# Patient Record
Sex: MALE | Race: WHITE
[De-identification: names, ages, dates, MRNs, and addresses within clinical notes are randomized per-mention and may not be internally consistent; named-entity substitution may affect disease eponyms.]

---

## 2018-07-17 ENCOUNTER — HOSPITAL ENCOUNTER (OUTPATIENT)
Dept: HOSPITAL 47 - RADXRMAIN | Age: 38
Discharge: HOME | End: 2018-07-17
Payer: MEDICARE

## 2018-07-17 DIAGNOSIS — R20.0: ICD-10-CM

## 2018-07-17 DIAGNOSIS — M25.551: Primary | ICD-10-CM

## 2018-07-17 PROCEDURE — 73502 X-RAY EXAM HIP UNI 2-3 VIEWS: CPT

## 2018-07-17 PROCEDURE — 72110 X-RAY EXAM L-2 SPINE 4/>VWS: CPT

## 2018-07-17 NOTE — XR
EXAMINATION TYPE: XR Hip Complete RT

 

DATE OF EXAM: 7/17/2018

 

CLINICAL HISTORY: Right hip pain.

 

TECHNIQUE:  AP and frogleg views of the right hip are obtained.

 

COMPARISON: Right femur x-ray May 21, 2012

 

FINDINGS:  There is no acute fracture/dislocation evident in the right hip.  The joint space in the r
ight hip appears within normal limits.  The overlying soft tissue appears unremarkable.

 

IMPRESSION: Unremarkable study.

## 2018-07-17 NOTE — XR
Lumbosacral spine

 

HISTORY: Leg numbness, low back pain

 

5 views of the lumbosacral spine

 

Correlation to prior exam 3/21/2016

 

There is no significant interval change. Mild lumbar spondylosis. Loss of disc height again noted L5-
S1. There is some straightening of the lumbar curvature. There may be a slight dextroscoliosis center
ed at L3.

 

IMPRESSION: Stable exam. There are findings compatible with degenerative disc disease.

## 2018-07-18 ENCOUNTER — HOSPITAL ENCOUNTER (EMERGENCY)
Dept: HOSPITAL 47 - EC | Age: 38
Discharge: HOME | End: 2018-07-18
Payer: MEDICARE

## 2018-07-18 VITALS — SYSTOLIC BLOOD PRESSURE: 140 MMHG | TEMPERATURE: 98 F | DIASTOLIC BLOOD PRESSURE: 71 MMHG | HEART RATE: 77 BPM

## 2018-07-18 VITALS — RESPIRATION RATE: 18 BRPM
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
## 2018-07-18 DIAGNOSIS — Z79.899: ICD-10-CM

## 2018-07-18 DIAGNOSIS — F17.200: ICD-10-CM

## 2018-07-18 DIAGNOSIS — M54.16: Primary | ICD-10-CM
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
## 2018-07-18 DIAGNOSIS — I10: ICD-10-CM

## 2018-07-18 DIAGNOSIS — Z98.890: ICD-10-CM
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
## 2018-07-18 PROCEDURE — 99283 EMERGENCY DEPT VISIT LOW MDM: CPT

## 2018-07-18 PROCEDURE — 96372 THER/PROPH/DIAG INJ SC/IM: CPT

## 2018-07-18 NOTE — ED
Back Pain HPI





- General


Chief Complaint: Back Pain/Injury


Stated Complaint: Back Pain


Time Seen by Provider: 07/18/18 22:03


Source: patient


Limitations: no limitations





- History of Present Illness


Initial Comments: 





This patient is 38-year-old man who presents with right low back pain and 

radiation of the pain to his right leg.  He does describe a radicular 

distribution down the posterior leg to the foot.  The patient states it started 

2 nights ago when he was on his porch having cigarette he states that he 

reached for something and then had some back pain.  The patient attempted to be 

seen here yesterday but states the wait was too long, so he did go and see a 

clinic doctor who took x-rays and told him that they looked fine.  Patient did 

not have trauma to the back.  He has not had any change in bowel or bladder 

function.  No saddle anesthesia.  No leg weakness.


MD Complaint: back pain


-: days(s)


Similar Symptoms Previously: No


Place: home


Radiation: right leg


Severity: moderate


Quality: burning, aching


Consistency: constant


Improves With: none


Worsens With: sitting upright


Context: turning/twisting


Associated Symptoms: denies other symptoms





- Related Data


 Home Medications











 Medication  Instructions  Recorded  Confirmed


 


Benazepril [Lotensin] 10 mg PO DAILY 07/18/18 07/18/18


 


HYDROcodone/APAP 10-325MG [Norco 1 tab PO Q6HR PRN 07/18/18 07/18/18





]   


 


Propranolol [Inderal] 40 mg PO DAILY 07/18/18 07/18/18


 


amLODIPine [Norvasc] 5 mg PO DAILY 07/18/18 07/18/18








 Previous Rx's











 Medication  Instructions  Recorded


 


Methocarbamol [Robaxin-750] 750 mg PO TID PRN #30 tablet 07/18/18


 


predniSONE 60 mg PO DAILY #30 tab 07/18/18











 Allergies











Allergy/AdvReac Type Severity Reaction Status Date / Time


 


No Known Allergies Allergy   Verified 07/18/18 22:23














Review of Systems


ROS Statement: 


Those systems with pertinent positive or pertinent negative responses have been 

documented in the HPI.





ROS Other: All systems not noted in ROS Statement are negative.


Constitutional: Denies: fever, chills


Respiratory: Denies: cough, dyspnea


Cardiovascular: Denies: chest pain, palpitations, edema


Gastrointestinal: Denies: abdominal pain, vomiting, diarrhea


Genitourinary: Denies: dysuria, hematuria


Musculoskeletal: Reports: as per HPI, back pain


Skin: Denies: rash


Neurological: Reports: as per HPI, paresthesias.  Denies: weakness, numbness





Past Medical History


Past Medical History: Hypertension, Seizure Disorder


Additional Past Medical History / Comment(s): PER PAST MEDCIAL HX: THORACIC/

LUMBAR FACET ARTHROPATHY(HAS RADIOFREQUENCY ABLATION), BACK PAIN, SEIZURE 1997


History of Any Multi-Drug Resistant Organisms: None Reported


Past Surgical History: Cholecystectomy, Hernia Repair, Orthopedic Surgery


Additional Past Surgical History / Comment(s): HERNIA REPAIR,HAD RECURRANT 

INCISONAL HERNIA X2 SX, RT ANKLE ORIF, PAIN CLINIC PROCEDURES, RADIOFREQUENCY 

ABLATION RT T12, L1-L2


Past Anesthesia/Blood Transfusion Reactions: No Reported Reaction


Past Psychological History: ADD/ADHD, Anxiety, Depression


Smoking Status: Current every day smoker


Past Alcohol Use History: Occasional


Past Drug Use History: Marijuana





- Past Family History


  ** Father


Family Medical History: Unable to Obtain





  ** Mother


Family Medical History: Unable to Obtain





General Exam


Limitations: no limitations


General appearance: alert, in no apparent distress


Head exam: Present: atraumatic, normocephalic


Eye exam: Present: normal appearance.  Absent: scleral icterus, conjunctival 

injection


Neck exam: Present: normal inspection


Respiratory exam: Present: normal lung sounds bilaterally.  Absent: respiratory 

distress, wheezes, rales, rhonchi, stridor


Cardiovascular Exam: Present: regular rate, normal rhythm, normal heart sounds.

  Absent: systolic murmur, diastolic murmur, rubs, gallop


GI/Abdominal exam: Present: soft.  Absent: distended, tenderness, guarding, 

rebound, rigid, mass, pulsatile mass


Extremities exam: Present: normal inspection, normal capillary refill.  Absent: 

pedal edema, calf tenderness


Back exam: Present: normal inspection, CVA tenderness (R), CVA tenderness (L).  

Absent: tenderness, paraspinal tenderness, vertebral tenderness


Neurological exam: Present: reflexes normal.  Absent: motor sensory deficit


Skin exam: Present: warm, dry, intact, normal color.  Absent: rash





Course


 Vital Signs











  07/18/18





  21:42


 


Temperature 98.1 F


 


Pulse Rate 63


 


Respiratory 18





Rate 


 


Blood Pressure 169/80


 


O2 Sat by Pulse 97





Oximetry 














Disposition


Clinical Impression: 


 Lumbar radiculopathy





Disposition: HOME SELF-CARE


Condition: Good


Instructions:  Acute Low Back Pain (ED)


Prescriptions: 


Methocarbamol [Robaxin-750] 750 mg PO TID PRN #30 tablet


 PRN Reason: pain


predniSONE 60 mg PO DAILY #30 tab


Is patient prescribed a controlled substance at d/c from ED?: No


Referrals: 


Lorraine Escobar MD [Primary Care Provider] - 1-2 days

## 2018-08-03 ENCOUNTER — HOSPITAL ENCOUNTER (OUTPATIENT)
Dept: HOSPITAL 47 - RADUSWWP | Age: 38
Discharge: HOME | End: 2018-08-03
Payer: MEDICARE

## 2018-08-03 DIAGNOSIS — R94.5: Primary | ICD-10-CM

## 2018-08-03 PROCEDURE — 76705 ECHO EXAM OF ABDOMEN: CPT

## 2018-08-03 NOTE — US
EXAMINATION TYPE: US liver

 

DATE OF EXAM: 8/3/2018

 

COMPARISON: NONE

 

CLINICAL HISTORY: R94.5 ABN LIVER FUNCTIONS.

 

EXAM MEASUREMENTS:

 

Liver Length:  15.0 cm   

Gallbladder Wall:  Surgically absent   

CBD:  0.3 cm

Right Kidney:  13.3 x 6.6 x 5.6 cm

 

 

 

Pancreas:  partially obscured by bowel gas, portions visualized wnl

Liver:  wnl  

Gallbladder:  wnl

**Evidence for sonographic George's sign: no

CBD:  wnl 

Right Kidney:  Upper limits of normal 

 

 

 

IMPRESSION: 

1. Normal right upper quadrant ultrasound

## 2018-11-10 ENCOUNTER — HOSPITAL ENCOUNTER (OUTPATIENT)
Dept: HOSPITAL 47 - EC | Age: 38
Setting detail: OBSERVATION
Discharge: ANOTHER HOSPITAL | End: 2018-11-10
Attending: HOSPITALIST | Admitting: HOSPITALIST
Payer: MEDICARE

## 2018-11-10 VITALS — SYSTOLIC BLOOD PRESSURE: 158 MMHG | DIASTOLIC BLOOD PRESSURE: 96 MMHG | TEMPERATURE: 97.9 F

## 2018-11-10 VITALS — RESPIRATION RATE: 16 BRPM

## 2018-11-10 VITALS — HEART RATE: 66 BPM

## 2018-11-10 VITALS — BODY MASS INDEX: 36.6 KG/M2

## 2018-11-10 DIAGNOSIS — G37.8: Primary | ICD-10-CM

## 2018-11-10 DIAGNOSIS — R26.2: ICD-10-CM

## 2018-11-10 DIAGNOSIS — G89.29: ICD-10-CM

## 2018-11-10 DIAGNOSIS — I10: ICD-10-CM

## 2018-11-10 DIAGNOSIS — M51.26: ICD-10-CM

## 2018-11-10 DIAGNOSIS — R19.7: ICD-10-CM

## 2018-11-10 DIAGNOSIS — F41.9: ICD-10-CM

## 2018-11-10 DIAGNOSIS — F90.9: ICD-10-CM

## 2018-11-10 DIAGNOSIS — Z90.49: ICD-10-CM

## 2018-11-10 DIAGNOSIS — G40.909: ICD-10-CM

## 2018-11-10 DIAGNOSIS — M46.95: ICD-10-CM

## 2018-11-10 DIAGNOSIS — F32.9: ICD-10-CM

## 2018-11-10 DIAGNOSIS — M54.9: ICD-10-CM

## 2018-11-10 DIAGNOSIS — Z79.899: ICD-10-CM

## 2018-11-10 DIAGNOSIS — F17.200: ICD-10-CM

## 2018-11-10 LAB
ANION GAP SERPL CALC-SCNC: 8 MMOL/L
BASOPHILS # BLD AUTO: 0.1 K/UL (ref 0–0.2)
BASOPHILS NFR BLD AUTO: 1 %
BUN SERPL-SCNC: 19 MG/DL (ref 9–20)
CALCIUM SPEC-MCNC: 9.2 MG/DL (ref 8.4–10.2)
CHLORIDE SERPL-SCNC: 107 MMOL/L (ref 98–107)
CO2 SERPL-SCNC: 25 MMOL/L (ref 22–30)
EOSINOPHIL # BLD AUTO: 0.2 K/UL (ref 0–0.7)
EOSINOPHIL NFR BLD AUTO: 1 %
ERYTHROCYTE [DISTWIDTH] IN BLOOD BY AUTOMATED COUNT: 5.71 M/UL (ref 4.3–5.9)
ERYTHROCYTE [DISTWIDTH] IN BLOOD: 13.7 % (ref 11.5–15.5)
GLUCOSE SERPL-MCNC: 100 MG/DL (ref 74–99)
HCT VFR BLD AUTO: 52.5 % (ref 39–53)
HGB BLD-MCNC: 17.3 GM/DL (ref 13–17.5)
LYMPHOCYTES # SPEC AUTO: 4.8 K/UL (ref 1–4.8)
LYMPHOCYTES NFR SPEC AUTO: 29 %
MCH RBC QN AUTO: 30.3 PG (ref 25–35)
MCHC RBC AUTO-ENTMCNC: 33 G/DL (ref 31–37)
MCV RBC AUTO: 91.9 FL (ref 80–100)
MONOCYTES # BLD AUTO: 0.9 K/UL (ref 0–1)
MONOCYTES NFR BLD AUTO: 6 %
NEUTROPHILS # BLD AUTO: 10.3 K/UL (ref 1.3–7.7)
NEUTROPHILS NFR BLD AUTO: 62 %
PH UR: 6.5 [PH] (ref 5–8)
PLATELET # BLD AUTO: 252 K/UL (ref 150–450)
POTASSIUM SERPL-SCNC: 4.1 MMOL/L (ref 3.5–5.1)
SODIUM SERPL-SCNC: 140 MMOL/L (ref 137–145)
SP GR UR: 1.02 (ref 1–1.03)
UROBILINOGEN UR QL STRIP: <2 MG/DL (ref ?–2)
WBC # BLD AUTO: 16.5 K/UL (ref 3.8–10.6)

## 2018-11-10 PROCEDURE — 81003 URINALYSIS AUTO W/O SCOPE: CPT

## 2018-11-10 PROCEDURE — 80306 DRUG TEST PRSMV INSTRMNT: CPT

## 2018-11-10 PROCEDURE — 80048 BASIC METABOLIC PNL TOTAL CA: CPT

## 2018-11-10 PROCEDURE — 85025 COMPLETE CBC W/AUTO DIFF WBC: CPT

## 2018-11-10 PROCEDURE — 85652 RBC SED RATE AUTOMATED: CPT

## 2018-11-10 PROCEDURE — 96372 THER/PROPH/DIAG INJ SC/IM: CPT

## 2018-11-10 PROCEDURE — 36415 COLL VENOUS BLD VENIPUNCTURE: CPT

## 2018-11-10 PROCEDURE — 96374 THER/PROPH/DIAG INJ IV PUSH: CPT

## 2018-11-10 PROCEDURE — 86140 C-REACTIVE PROTEIN: CPT

## 2018-11-10 PROCEDURE — 99285 EMERGENCY DEPT VISIT HI MDM: CPT

## 2018-11-10 PROCEDURE — 94640 AIRWAY INHALATION TREATMENT: CPT

## 2018-11-10 PROCEDURE — 71045 X-RAY EXAM CHEST 1 VIEW: CPT

## 2018-11-10 RX ADMIN — ISODIUM CHLORIDE PRN MG: 0.03 SOLUTION RESPIRATORY (INHALATION) at 23:21

## 2018-11-10 RX ADMIN — ISODIUM CHLORIDE PRN MG: 0.03 SOLUTION RESPIRATORY (INHALATION) at 18:51

## 2018-11-10 NOTE — XR
EXAMINATION TYPE: XR chest 1V portable

 

DATE OF EXAM: 11/10/2018

 

CLINICAL HISTORY: Difficulty breathing

 

TECHNIQUE: Single AP portable upright view of the chest is obtained.

 

COMPARISON: Chest radiograph 11/6/2018

 

FINDINGS:  There is no focal air space opacity, pleural effusion, or pneumothorax seen.  The cardiac 
silhouette size is within normal limits.   The osseous structures are intact.

 

IMPRESSION:  No acute process. No significant interval change.

,

## 2018-11-10 NOTE — ED
General Adult HPI





- General


Chief complaint: Weakness


Stated complaint: Numbness in lower extremity


Time Seen by Provider: 11/10/18 11:27


Source: patient, EMS


Mode of arrival: EMS


Limitations: no limitations





- History of Present Illness


Initial comments: 


38-year-old male presenting with worsening lower extremely weakness.  Patient 

states that he was seen and admitted to the hospital this past Tuesday.  He had 

an MRI and epidural injection done by Dr. Petit.  He states while inpatient he 

was having lower extremity weakness.  He was discharged with a walker and told 

to follow-up in the office as well as with PT.  Patient states since his 

discharge she's been unable to ambulate.  Has been having to use a bedside 

urinal.  He denies any groin numbness or difficulty with bowel and bladder 

function.  He denies any fevers or chills or worsening back pain.








- Related Data


 Home Medications











 Medication  Instructions  Recorded  Confirmed


 


Benazepril [Lotensin] 10 mg PO DAILY 07/18/18 11/10/18


 


Propranolol [Inderal] 40 mg PO DAILY 07/18/18 11/10/18


 


amLODIPine [Norvasc] 5 mg PO DAILY 07/18/18 11/10/18


 


Albuterol Inhaler [Ventolin Hfa 1 - 2 puff INHALATION RT-Q6H PRN 11/10/18 11/10/

18





Inhaler]   








 Previous Rx's











 Medication  Instructions  Recorded


 


Ibuprofen [Motrin] 400 mg PO Q6HR PRN #30 tab 11/08/18


 


Ranitidine HCl [Zantac] 150 mg PO BID #60 tab 11/08/18











 Allergies











Allergy/AdvReac Type Severity Reaction Status Date / Time


 


No Known Allergies Allergy   Verified 11/10/18 12:11














Review of Systems


ROS Statement: 


Those systems with pertinent positive or pertinent negative responses have been 

documented in the HPI.


Review of Systems


Constitutional: Denies fever, chills 


Eyes: Denies change in vision, Denies pain


Ears, nose, mouth, throat: Denies headaches, Denies sore throat


Cardiovascular: Denies chest pain. Denies palpitations 


Respiratory: Denies shortness of breath, Denies cough


Gastrointestinal: Denies abdominal pain. Denies nausea, vomiting, diarrhea. 


Genitourinary: Denies hematuria, Denies infections


Musculoskeletal: Denies pain, Denies swelling


Integumentary: Denies rash


Neurological: Denies headache, lower extremity weakness, focal numbness


Psychiatric: Denies anxiety, Denies depression


Hematologic/Lymphatic: Denies easy bleeding or bruising 


ROS Other: All systems not noted in ROS Statement are negative.





Past Medical History


Past Medical History: Hypertension, Seizure Disorder


Additional Past Medical History / Comment(s): PER PAST MEDCIAL HX: THORACIC/

LUMBAR FACET ARTHROPATHY(HAS RADIOFREQUENCY ABLATION), BACK PAIN, SEIZURE 1997, 

broken finger, on chronic pain management


History of Any Multi-Drug Resistant Organisms: None Reported


Past Surgical History: Cholecystectomy, Hernia Repair, Orthopedic Surgery


Additional Past Surgical History / Comment(s): HERNIA REPAIR,HAD RECURRANT 

INCISONAL HERNIA X2 SX, RT ANKLE ORIF, PAIN CLINIC PROCEDURES, RADIOFREQUENCY 

ABLATION RT T12, L1-L2


Past Anesthesia/Blood Transfusion Reactions: No Reported Reaction


Past Psychological History: ADD/ADHD, Anxiety, Depression


Smoking Status: Current every day smoker


Past Alcohol Use History: Daily, Occasional


Past Drug Use History: None Reported





- Past Family History


  ** Father


Family Medical History: Unable to Obtain





  ** Mother


Family Medical History: Unable to Obtain





General Exam





- General Exam Comments


Initial Comments: 


General: Awake, alert, No acute Distress


HENT: Normocephalic. Atraumatic


Eyes: PERRL. EOMI. No scleral icterus. No injected conjunctiva


Neck: Full ROM


Chest/Lungs: Clear to auscultation bilaterally. No wheezing, rhonchi, or rales


Cardiac: Regular rate, rhythm. No murmurs or rubs


Abdomen/GI: Soft, nontender, nondistended. No rebound, guarding, or rigidity.


Musculoskeletal: 4/5 strength in bilateral lower extremity muscle groups. 2+ 

reflexes. L5-S1 sensation intact bilaterally. Unable to stand to ambulate


Skin: Warm, dry, intact


Neurologic: A/Ox3, no weakness, no sensory deficit,  no coordination deficit. 

Unable to ambulate





Limitations: no limitations





Course


 Vital Signs











  11/10/18 11/10/18





  11:12 12:35


 


Temperature 98.2 F 97.8 F


 


Pulse Rate 55 L 56 L


 


Respiratory 18 18





Rate  


 


Blood Pressure 161/94 162/91


 


O2 Sat by Pulse 96 96





Oximetry  














Medical Decision Making





- Medical Decision Making


38-year-old male presenting with inability to ambulate.  Initial exam the 

patient is awake, alert, no acute distress.  VSS.  He has no new neurologic 

deficit or symptoms concerning for cauda equina syndrome.  I reviewed the 

patient's chart and saw that he recently had an MRI and a lumbar epidural 

injection. He was seen by PT/OT and was ambulating with a walker inpatient.  

Patient is refusing to stand to ambulate stating he is unable to do so 

secondary to pain.  I spoke with Dr. Petit states to have the patient admitted 

to medicine he does not feel he at this time is acute surgical candidate.  I 

spoke with Dr. Gillette who would like the patient placed in observation under 

Dr. Dallas. Patient is currently stable for transfer to the floor. 








- Lab Data


Result diagrams: 


 11/10/18 11:49





 11/10/18 11:49


 Lab Results











  11/10/18 11/10/18 Range/Units





  11:49 11:49 


 


WBC   16.5 H  (3.8-10.6)  k/uL


 


RBC   5.71  (4.30-5.90)  m/uL


 


Hgb   17.3  (13.0-17.5)  gm/dL


 


Hct   52.5  (39.0-53.0)  %


 


MCV   91.9  (80.0-100.0)  fL


 


MCH   30.3  (25.0-35.0)  pg


 


MCHC   33.0  (31.0-37.0)  g/dL


 


RDW   13.7  (11.5-15.5)  %


 


Plt Count   252  (150-450)  k/uL


 


Neutrophils %   62  %


 


Lymphocytes %   29  %


 


Monocytes %   6  %


 


Eosinophils %   1  %


 


Basophils %   1  %


 


Neutrophils #   10.3 H  (1.3-7.7)  k/uL


 


Lymphocytes #   4.8  (1.0-4.8)  k/uL


 


Monocytes #   0.9  (0-1.0)  k/uL


 


Eosinophils #   0.2  (0-0.7)  k/uL


 


Basophils #   0.1  (0-0.2)  k/uL


 


Sodium  140   (137-145)  mmol/L


 


Potassium  4.1   (3.5-5.1)  mmol/L


 


Chloride  107   ()  mmol/L


 


Carbon Dioxide  25   (22-30)  mmol/L


 


Anion Gap  8   mmol/L


 


BUN  19   (9-20)  mg/dL


 


Creatinine  0.77   (0.66-1.25)  mg/dL


 


Est GFR (CKD-EPI)AfAm  >90   (>60 ml/min/1.73 sqM)  


 


Est GFR (CKD-EPI)NonAf  >90   (>60 ml/min/1.73 sqM)  


 


Glucose  100 H   (74-99)  mg/dL


 


Calcium  9.2   (8.4-10.2)  mg/dL














Disposition


Clinical Impression: 


 Unable to ambulate, Back pain





Disposition: ADMITTED AS IP TO THIS \[facility]\""


Condition: Good


Decision to Admit Reason: Admit from EC


Decision Date: 11/10/18


Decision Time: 12:36

## 2018-11-10 NOTE — HP
HISTORY AND PHYSICAL



DATE OF SERVICE:

11/10/2018



CHIEF COMPLAINT:

Bilateral leg weakness and numbness and arm weakness.



HISTORY OF PRESENT ILLNESS:

This 38-year-old gentleman with a past medical history of multiple medical problems

including hypertension, seizure disorder, history of DJD, hernia repair,

cholecystectomy, being followed by Dr. Ecsobar in the outpatient setting, was recently

admitted with bilateral leg numbness and weakness and difficulty in walking.  MRA

showed right-sided L5-S1 moderate disk herniation. She was seen by Dr. Petit,

recommended epidural injection and the patient was given a walker.  The patient went

home but subsequently patient had difficulty in walking and the patient also noted some

weakness of the upper limb and both lower limbs and patient came to [facility]

and was admitted for further evaluation and treatment.  There is no history of fever or

rigors.  No history of headaches, loss of consciousness or seizures at this time.  A CT

scan of the head and cervical spine was also done previously, which showed no active

changes as well. Patient complains of urinary difficulties also.



PAST MEDICAL HISTORY:

History of seizure disorder, history of DJD, history of hypertension, cholecystectomy,

hernia repair.



MEDICATIONS:

1. Norvasc 5 mg p.o. daily.

2. Zantac 150 mg b.i.d.

3. Imdur 40 mg daily.

4. Motrin 400 mg every 6 hours p.r.n.

5. Lortab 10 mg.

6. Albuterol 2 puffs every 6 hours p.r.n.



ALLERGIES:

None.



FAMILY HISTORY:

No history of heart disease or strokes in the family.



SOCIAL HISTORY:

History of smoking.  No alcohol. No history of substance abuse.



REVIEW OF SYSTEMS:

ENT: No diminished vision.

CARDIOVASCULAR: No angina or palpitations.

RESPIRATORY: No cough or hemoptysis.

GI: No nausea.

: No dysuria.

NERVOUS SYSTEM:  As mentioned earlier.

HEMATOLOGY, IMMUNOLOGY: Asthma, hayfever.

MUSCULOSKELETAL: As mentioned.

HEMATOLOGY: No history of anemia.

ENDOCRINE: No history of diabetes or hypothyroidism.

CONSTITUTIONAL:  As mentioned.

DERMATOLOGY: Negative.

RHEUMATOLOGY: Negative.

PSYCHIATRY: As mentioned earlier.



PHYSICAL EXAM:

Patient is alert, oriented x3.  Pulse is 56, blood pressure 160/91, respirations 18,

temperature 97.8, pulse ox 98% on room air. HEENT: Conjunctivae normal.  Oral mucosa

moist.

NECK: No jugular venous distention.  No lymph node enlargement.

CARDIOVASCULAR: S1 and S2 muffled.

LUNGS: Breath sounds diminished at the bases. No rhonchi.  No crackles.

ABDOMEN:  Soft, nontender.  No mass palpable.

LEGS: No edema. No swelling.

LYMPHATICS: No lymph nodes palpable in the neck, axillae or groin.

SKIN: No ulcer or rash or bleeding.

NERVOUS SYSTEM: The patient has significant weakness of the proximal muscles and

diffuse weakness also noted.  No sensory abnormalities. Reflexes are diminished.  The

patient is unable to ambulate without support.



LABS:

WBC 16.5, sodium 140, potassium 4.1.



ASSESSMENT:

1. Diffuse weakness of the upper and lower limbs, rule out polyneuropathy or Guillain

    Dolomite syndrome.

2. Possible radiculopathy, right L5-S1.

3. Increased WBC.

4. Hypertension.

5. Seizure disorder.

6. History of degenerative joint disease.

7. History of radiofrequency ablation.

8. History of cholecystectomy.

9. History of attention deficit disorder, attention deficit hyperactivity disorder.

10.Anxiety, depression.

11.History of nicotine dependence.



RECOMMENDATIONS AND DISCUSSION:

In this 88-year-old gentleman who presented with multiple complex medical issues, we

will monitor the patient closely, continue the current management and symptomatic

treatment.  I recommend neuro checks and also Urology evaluation.  I would also

recommend PT/OT evaluation.  Dr. Petit will be consulted.  DVT prophylaxis.

Symptomatic treatment.  Resume the home medications.  Monitor blood pressure closely.

Increase the dose of lisinopril.  Overall prognosis guarded because of multiple complex

medical issues as mentioned earlier.  Further recommendations to follow.  I would also

recommend a baseline EKG and chest x-ray also. Prognosis guarded. Further

recommendations to follow. Discussed with the patient.



Copy forwarded to Dr. Escobar, primary care physician.





MMODL / IJN: [id number] / Job#: [id number]

## 2018-11-10 NOTE — P.CNNES
History of Present Illness


Consult date: 11/10/18


History of Present Illness: 


Dr.N Morris notified of consult 8:30 PM on 11/10/18


The patient is a 38-year-old man who was recently discharged from [facility] on 11/8/2018 where he presented at that time with lumbar 

radicular symptoms of right leg weakness.  At that time his complaint was right 

leg weakness and numbness and he had an MRI of the lumber spine which showed 

moderate disc herniation right L5-S1.  At that time the patient was seen by 

physical therapy and given a walker.  He states he walked well with a walker 

the day before discharge.  He was evaluated by orthopedic surgery   He had the 

epidural injection and then was discharged home but reports he became 

progressively weaker . He states that he was unable to get out of bed at home 

for the past 2 days.  He states that 2 nights ago  he began experiencing 

tingling in the hands and the following day ,on Friday he developed numbness as 

well as weakness in the hands and on Friday evening he experienced new onset of 

weakness in the left leg as well.  Prior to this he had only experienced right 

leg weakness and numbness.  Currently he presents with bilateral leg weakness ,

numbness and tingling as well as bilateral hand weakness and tingling which is 

new from his initial presentation 


He presented to the emergency room today with complaints of difficulty 

ambulating. Patient states that his wife has been helping him at home to 

urinate for the past 2 days by laying him on his side.  He does know when he 

has to urinate.  There has been no incontinence.  He denies any fever.  He has 

chronic diarrhea.  He has chronic back problems .





Review of Systems


Constitutional: Denies chills, Denies fever


Eyes: denies blurred vision, denies pain


Cardiovascular: Denies chest pain, Denies shortness of breath


Respiratory: Denies cough


Musculoskeletal: Denies myalgias


Neurological: Denies numbness, Denies weakness


Psychiatric: Denies anxiety, Denies depression





Past Medical History


Past Medical History: Hypertension, Seizure Disorder


Additional Past Medical History / Comment(s): PER PAST MEDCIAL HX: THORACIC/

LUMBAR FACET ARTHROPATHY(HAS RADIOFREQUENCY ABLATION), BACK PAIN, SEIZURE 1997, 

broken finger, on chronic pain management


History of Any Multi-Drug Resistant Organisms: None Reported


Past Surgical History: Cholecystectomy, Hernia Repair, Orthopedic Surgery


Additional Past Surgical History / Comment(s): HERNIA REPAIR,HAD RECURRANT 

INCISONAL HERNIA X2 SX, RT ANKLE ORIF, PAIN CLINIC PROCEDURES, RADIOFREQUENCY 

ABLATION RT T12, L1-L2


Past Anesthesia/Blood Transfusion Reactions: No Reported Reaction


Past Psychological History: ADD/ADHD, Anxiety, Depression


Smoking Status: Current every day smoker


Past Alcohol Use History: Daily, Occasional


Additional Past Alcohol Use History / Comment(s): patient states that he drinks 

6 neers daily. patient states that he does go days without and alcohol and has 

never had issues with withdrawal


Past Drug Use History: None Reported





- Past Family History


  ** Father


Family Medical History: Unable to Obtain





  ** Mother


Family Medical History: Unable to Obtain





Medications and Allergies


 Home Medications











 Medication  Instructions  Recorded  Confirmed  Type


 


Benazepril [Lotensin] 10 mg PO DAILY 07/18/18 11/10/18 History


 


Propranolol [Inderal] 40 mg PO DAILY 07/18/18 11/10/18 History


 


amLODIPine [Norvasc] 5 mg PO DAILY 07/18/18 11/10/18 History


 


Ibuprofen [Motrin] 400 mg PO Q6HR PRN #30 tab 11/08/18 11/10/18 Rx


 


Ranitidine HCl [Zantac] 150 mg PO BID #60 tab 11/08/18 11/10/18 Rx


 


Albuterol Inhaler [Ventolin Hfa 1 - 2 puff INHALATION RT-Q6H PRN 11/10/18 11/10/

18 History





Inhaler]    











 Allergies











Allergy/AdvReac Type Severity Reaction Status Date / Time


 


No Known Allergies Allergy   Verified 11/10/18 12:11














Physical Examination





- Vital Signs


Vital Signs: 


 Vital Signs











  Temp Pulse Pulse Resp BP BP Pulse Ox


 


 11/10/18 20:10    68  16   


 


 11/10/18 20:00  97.9 F   68  16   158/96  97


 


 11/10/18 19:00   88     


 


 11/10/18 18:52   84     


 


 11/10/18 15:15  98 F   63  16   148/99  96


 


 11/10/18 13:30  97.9 F   60  16   160/90  93 L


 


 11/10/18 12:35  97.8 F  56 L   18  162/91   96


 


 11/10/18 11:12  98.2 F  55 L   18  161/94   96








 Intake and Output











 11/10/18 11/10/18 11/10/18





 06:59 14:59 22:59


 


Intake Total  200 222


 


Output Total   2150


 


Balance  200 -1928


 


Intake:   


 


  Oral  200 222


 


Output:   


 


  Urine   2150


 


    Straight   2150


 


Other:   


 


  Weight  122.47 kg 122.47 kg














- Constitutional


General appearance: obese





- EENT


EENT: PERRL





- Respiratory


Respiratory: lungs clear





- Cardiovascular


Cardiovascular: regular rate, normal S1, normal S2





- Integumentary


Integumentary: normal





- Neurologic





Neurologic examination:





Mental status: He was awake alert and oriented.  There was no  aphasia or 

dysarthria.





Cranial nerve examination: Cranial nerves II through XII grossly intact





Motor examination:    Bilateral lower extremity weakness unable to lift legs 

off of bed.  Bilateral upper extremity weakness 





Sensory examination: Decreased light touch both lower extremities and bilateral 

upper extremities 





DTRs: Absent bilateral upper and lower extremities





Coordination and gait: Unable to check





Results





- Laboratory Findings


CBC and BMP: 


 11/10/18 11:49





 11/10/18 11:49


Abnormal Lab Findings: 


 Abnormal Labs











  11/10/18 11/10/18





  11:49 11:49


 


WBC   16.5 H


 


Neutrophils #   10.3 H


 


Glucose  100 H 














Assessment and Plan


(1) AIDP (acute inflammatory demyelinating polyneuropathy)


Current Visit: Yes   Status: Acute   Code(s): G37.8 - OTH DEMYELINATING 

DISEASES OF CENTRAL NERVOUS SYSTEM   SNOMED Code(s): 17383648


   





(2) Lumbar herniated disc


Current Visit: No   Status: Acute   Code(s): M51.26 - OTHER INTERVERTEBRAL DISC 

DISPLACEMENT, LUMBAR REGION   SNOMED Code(s): 347876167


   





(3) Chronic back pain


Current Visit: Yes   Status: Acute   SNOMED Code(s): 512216234


   





(4) Unable to ambulate


Current Visit: Yes   Status: Acute   Code(s): R26.2 - DIFFICULTY IN WALKING, 

NOT ELSEWHERE CLASSIFIED   SNOMED Code(s): 806967185


   


Plan: 





The patient is a 38-year-old man who presents to the hospital with increasing 

weakness and numbness involving all 4 extremities.  The patient was recently 

admitted with lumbar radicular presentation with back pain and right leg 

weakness and herniated disc right lumbar disc and  returns to the hospital with 

bilateral leg weakness as well as bilateral hand weakness and numbness.   His 

symptoms have been progressive over the last 2 days to the involvement of his 

upper extremities as well as his left leg.   The patient's clinical 

presentation currently is suggestive of possible AIDP  The patient will be 

transferred to [facility] for further management and care.  Discussed  

at length plan with patient  who is willing to transfer.  Discussed patient's 

case with transfer team and with neurologist Dr. JAY at [facility] 

who is willing to accept the patient.

## 2018-11-19 NOTE — DS
DISCHARGE SUMMARY



FINAL DIAGNOSES:

1. Diffuse weakness in upper and lower limbs, possibly polyneuropathy or Guillain-

    [place] syndrome.

2. Possible radiculopathy, right L5-S1.

3. Increased white count.

4. Hypertension.

5. Seizure disorder.

6. History of degenerative joint disease.

7. History of radiofrequency ablation.

8. History of cholecystectomy.

9. Possible acute inflammatory demyelinating polyneuropathy.



DISCHARGE DISPOSITION:

The patient will be discharged in stable condition with guarded prognosis.



HISTORY OF PRESENT ILLNESS:

This 38-year-old gentleman with a past medical history of multiple medical problems was

admitted with diffuse weakness.  Neurology saw the patient and the possibility of

Guillain-[place] syndrome was considered.  The patient was subsequently transferred to

[facility] in stable condition with guarded prognosis.  The prognosis remained

guarded; however, the patient is stable. Please see Dr. Morris's note and multiple

other notes for further information.  Please also refer to the chart for current

medications.





MMODL / IJN: [id number] / Job#: [id number]

## 2019-04-22 ENCOUNTER — HOSPITAL ENCOUNTER (EMERGENCY)
Dept: HOSPITAL 47 - EC | Age: 39
LOS: 1 days | Discharge: HOME | End: 2019-04-23
Payer: MEDICARE

## 2019-04-22 VITALS — RESPIRATION RATE: 18 BRPM

## 2019-04-22 DIAGNOSIS — D72.829: ICD-10-CM

## 2019-04-22 DIAGNOSIS — Z90.49: ICD-10-CM

## 2019-04-22 DIAGNOSIS — R11.2: Primary | ICD-10-CM

## 2019-04-22 DIAGNOSIS — Z79.899: ICD-10-CM

## 2019-04-22 DIAGNOSIS — R19.7: ICD-10-CM

## 2019-04-22 DIAGNOSIS — I10: ICD-10-CM

## 2019-04-22 DIAGNOSIS — F17.200: ICD-10-CM

## 2019-04-22 DIAGNOSIS — Z79.891: ICD-10-CM

## 2019-04-22 DIAGNOSIS — R25.2: ICD-10-CM

## 2019-04-22 DIAGNOSIS — Z87.39: ICD-10-CM

## 2019-04-22 LAB
ALBUMIN SERPL-MCNC: 4.8 G/DL (ref 3.5–5)
ALP SERPL-CCNC: 105 U/L (ref 38–126)
ALT SERPL-CCNC: 114 U/L (ref 21–72)
ANION GAP SERPL CALC-SCNC: 14 MMOL/L
AST SERPL-CCNC: 42 U/L (ref 17–59)
BASOPHILS # BLD AUTO: 0 K/UL (ref 0–0.2)
BASOPHILS NFR BLD AUTO: 0 %
BUN SERPL-SCNC: 11 MG/DL (ref 9–20)
CALCIUM SPEC-MCNC: 9.8 MG/DL (ref 8.4–10.2)
CHLORIDE SERPL-SCNC: 105 MMOL/L (ref 98–107)
CO2 SERPL-SCNC: 23 MMOL/L (ref 22–30)
EOSINOPHIL # BLD AUTO: 0.3 K/UL (ref 0–0.7)
EOSINOPHIL NFR BLD AUTO: 2 %
ERYTHROCYTE [DISTWIDTH] IN BLOOD BY AUTOMATED COUNT: 5.92 M/UL (ref 4.3–5.9)
ERYTHROCYTE [DISTWIDTH] IN BLOOD: 13.6 % (ref 11.5–15.5)
GLUCOSE SERPL-MCNC: 131 MG/DL (ref 74–99)
HCT VFR BLD AUTO: 52.8 % (ref 39–53)
HGB BLD-MCNC: 18.2 GM/DL (ref 13–17.5)
HYALINE CASTS UR QL AUTO: 1 /LPF (ref 0–2)
LIPASE SERPL-CCNC: 100 U/L (ref 23–300)
LYMPHOCYTES # SPEC AUTO: 1 K/UL (ref 1–4.8)
LYMPHOCYTES NFR SPEC AUTO: 7 %
MCH RBC QN AUTO: 30.7 PG (ref 25–35)
MCHC RBC AUTO-ENTMCNC: 34.4 G/DL (ref 31–37)
MCV RBC AUTO: 89.1 FL (ref 80–100)
MONOCYTES # BLD AUTO: 0.5 K/UL (ref 0–1)
MONOCYTES NFR BLD AUTO: 3 %
NEUTROPHILS # BLD AUTO: 12 K/UL (ref 1.3–7.7)
NEUTROPHILS NFR BLD AUTO: 86 %
PH UR: 6.5 [PH] (ref 5–8)
PLATELET # BLD AUTO: 218 K/UL (ref 150–450)
POTASSIUM SERPL-SCNC: 4.1 MMOL/L (ref 3.5–5.1)
PROT SERPL-MCNC: 8.1 G/DL (ref 6.3–8.2)
RBC UR QL: 3 /HPF (ref 0–5)
SODIUM SERPL-SCNC: 142 MMOL/L (ref 137–145)
SP GR UR: >1.05 (ref 1–1.03)
SQUAMOUS UR QL AUTO: 1 /HPF (ref 0–4)
UROBILINOGEN UR QL STRIP: <2 MG/DL (ref ?–2)
WBC # BLD AUTO: 13.9 K/UL (ref 3.8–10.6)
WBC #/AREA URNS HPF: 1 /HPF (ref 0–5)

## 2019-04-22 PROCEDURE — 96361 HYDRATE IV INFUSION ADD-ON: CPT

## 2019-04-22 PROCEDURE — 85025 COMPLETE CBC W/AUTO DIFF WBC: CPT

## 2019-04-22 PROCEDURE — 83690 ASSAY OF LIPASE: CPT

## 2019-04-22 PROCEDURE — 96374 THER/PROPH/DIAG INJ IV PUSH: CPT

## 2019-04-22 PROCEDURE — 99284 EMERGENCY DEPT VISIT MOD MDM: CPT

## 2019-04-22 PROCEDURE — 80053 COMPREHEN METABOLIC PANEL: CPT

## 2019-04-22 PROCEDURE — 36415 COLL VENOUS BLD VENIPUNCTURE: CPT

## 2019-04-22 PROCEDURE — 74177 CT ABD & PELVIS W/CONTRAST: CPT

## 2019-04-22 PROCEDURE — 81001 URINALYSIS AUTO W/SCOPE: CPT

## 2019-04-22 NOTE — CT
EXAM:

  CT Abdomen and Pelvis With Intravenous Contrast

 

CLINICAL HISTORY:

   pain

 

TECHNIQUE:

  Axial computed tomography images of the abdomen and pelvis with 100ml 

of Isovue 300,  intravenous contrast.  CTDI is 37.6 mGy and DLP is 1834.9 

mGy-cm.  This CT exam was performed using one or more of the following 

dose reduction techniques: automated exposure control, adjustment of the 

mA and/or kV according to patient size, and/or use of iterative 

reconstruction technique.

 

COMPARISON:

  No relevant prior studies available.

 

FINDINGS:

  Lung bases:  Unremarkable.  No mass.  No consolidation.

 

 ABDOMEN:

  Liver:  Unremarkable.  No mass.

  Gallbladder and bile ducts: Gallbladder surgically absent.  No 

intrahepatic ductal dilatation

.

  Pancreas:  Unremarkable.  No mass.  No ductal dilation.

  Spleen:  Unremarkable.  No splenomegaly.

  Adrenals:  Unremarkable.  No mass.

  Kidneys and ureters:  Unremarkable.  No solid mass.  No hydronephrosis.

  Stomach and bowel:  Unremarkable.  No obstruction.  No mucosal 

thickening.

 

 PELVIS:

  Appendix: The appendix is unremarkable.

  Bladder:  Unremarkable.  No mass.

  Reproductive:  Unremarkable as visualized.

 

 ABDOMEN and PELVIS:

  Intraperitoneal space:  Unremarkable.  No free air.  No significant 

fluid collection.

  Bones/joints:  No acute fracture.  No dislocation.

  Soft tissues:  Unremarkable.

  Vasculature:  Unremarkable.  No abdominal aortic aneurysm.

  Lymph nodes:  Unremarkable.  No enlarged lymph nodes.

 

IMPRESSION:     

No acute abnormality demonstrated in the abdomen or pelvis

## 2019-04-22 NOTE — ED
Nausea/Vomiting/Diarrhea HPI





- General


Chief complaint: Nausea/Vomiting/Diarrhea


Stated complaint: Vomiting, Diarrhea


Time Seen by Provider: 04/22/19 21:20


Source: patient, family


Mode of arrival: ambulatory





- History of Present Illness


Initial comments: 





The patient is a 39-year-old male who presents to the emergency room with 

complaints of nausea, vomiting and diarrhea.  The symptoms were sudden onset 

approximately 2 hours ago.  He states that last night he ate a bunch of the 

Easter candy.  Today he ate several candy bars for breakfast.  Later this 

evening he began to have abdominal cramping.  He had a rush to the bathroom and 

had 3 separate episodes of nonbloody diarrhea.  He states that his stools are 

entirely watery.  He was then driving and had to pull over.  He ended up having 

an episode of emesis within a party store parking lot.  Afterwards he went 

inside and picked up some verners.  States that he has been able to drink and 

hold down the Verners however he continues to have abdominal cramping and 

diarrhea.  Denies any recent travel or antibiotic use.  Denies any sick 

contacts.  Admits that he possibly could have been some tainted foods.  Denies 

any fevers or chills.  No hemoptysis or hematemesis.  Denies any changes in his 

urination to include dysuria, hematuria defective voiding.  Denies any melanotic

stools or hematochezia.  He did not attempt to take any medications at home for 

symptoms.  There are no other alleviating, precipitating or modifying factors





- Related Data


                                Home Medications











 Medication  Instructions  Recorded  Confirmed


 


Benazepril [Lotensin] 10 mg PO DAILY 07/18/18 04/22/19


 


Propranolol [Inderal] 40 mg PO DAILY 07/18/18 04/22/19


 


amLODIPine [Norvasc] 5 mg PO DAILY 07/18/18 04/22/19


 


Albuterol Inhaler [Ventolin Hfa 1 - 2 puff INHALATION RT-Q6H PRN 11/10/18 

04/22/19





Inhaler]   


 


HYDROcodone/APAP 10-325MG [Norco 1 tab PO QID 04/22/19 04/22/19





]   








                                  Previous Rx's











 Medication  Instructions  Recorded


 


Dicyclomine [Bentyl] 10 mg PO TID PRN #15 capsule 04/22/19


 


Ondansetron Odt [Zofran Odt] 4 mg PO Q8HR PRN #10 tab 04/22/19











                                    Allergies











Allergy/AdvReac Type Severity Reaction Status Date / Time


 


No Known Allergies Allergy   Verified 04/22/19 21:31














Review of Systems


ROS Statement: 


Those systems with pertinent positive or pertinent negative responses have been 

documented in the HPI.





ROS Other: All systems not noted in ROS Statement are negative.





Past Medical History


Past Medical History: Hypertension, Seizure Disorder


Additional Past Medical History / Comment(s): PER PAST MEDCIAL HX: 

THORACIC/LUMBAR FACET ARTHROPATHY(HAS RADIOFREQUENCY ABLATION), BACK PAIN, 

SEIZURE 1997, broken finger, on chronic pain management


History of Any Multi-Drug Resistant Organisms: None Reported


Past Surgical History: Cholecystectomy, Hernia Repair, Orthopedic Surgery


Additional Past Surgical History / Comment(s): HERNIA REPAIR,HAD RECURRANT 

INCISONAL HERNIA X2 SX,  PAIN CLINIC PROCEDURES, RADIOFREQUENCY ABLATION RT T12,

L1-L2


Past Anesthesia/Blood Transfusion Reactions: No Reported Reaction


Past Psychological History: No Psychological Hx Reported


Smoking Status: Current every day smoker


Past Alcohol Use History: Daily, Occasional


Past Drug Use History: None Reported





- Past Family History


  ** Father


Family Medical History: Unable to Obtain





  ** Mother


Family Medical History: Unable to Obtain





General Exam


General appearance: alert, in no apparent distress


Head exam: Present: atraumatic, normocephalic, normal inspection


Eye exam: Present: normal appearance, PERRL, EOMI.  Absent: scleral icterus, 

conjunctival injection, periorbital swelling


ENT exam: Present: normal exam, mucous membranes moist


Neck exam: Present: normal inspection.  Absent: tenderness, meningismus, 

lymphadenopathy


Respiratory exam: Present: normal lung sounds bilaterally.  Absent: respiratory 

distress, wheezes, rales, rhonchi, stridor


Cardiovascular Exam: Present: regular rate, normal rhythm, normal heart sounds. 

Absent: systolic murmur, diastolic murmur, rubs, gallop, clicks


GI/Abdominal exam: Present: soft, tenderness, normal bowel sounds, other (The 

patient has mild tenderness to palpation of his periumbilical region.  No 

peritoneal signs).  Absent: distended, guarding, rebound, rigid


Extremities exam: Present: normal inspection, full ROM, normal capillary refill.

 Absent: tenderness, pedal edema, joint swelling, calf tenderness


Back exam: Present: normal inspection


Neurological exam: Present: alert, oriented X3, CN II-XII intact


Psychiatric exam: Present: normal affect, normal mood


Skin exam: Present: warm, dry, intact, normal color.  Absent: rash





Course


                                   Vital Signs











  04/22/19 04/22/19 04/23/19





  21:07 23:23 00:08


 


Temperature 98.4 F  99.1 F


 


Pulse Rate 91 75 86


 


Respiratory 18 18 18





Rate   


 


Blood Pressure 148/88 136/82 149/86


 


O2 Sat by Pulse 95 97 97





Oximetry   














Medical Decision Making





- Medical Decision Making





The patient was placed into room 7.  He is hooked up to continuous pulse ox and 

cardiac monitoring.  We did obtain IV access.  The patient was given 4 mg of 

Zofran for his nausea.  I did recommend laboratory studies and a CT of the 

patient's abdomen and pelvis.  Upon return results, I did discuss them with the 

patient.  He has not had any episodes of vomiting while within the emergency 

room.  He has had one episode of loose watery stool.  I did discuss diagnosis, 

differential and treatment options.  The patient does have a mild leukocytosis a

t this time.  His CT is negative for any acute intra-abdominal findings.  At 

this time the patient will be discharged home.  He is given a tablet of Bentyl 

10 mg and a tablet of Zofran 4 mg to take home.  I did additionally prescribe 

him these medications which were sent to his pharmacy.  He is to eat a bland 

diet and take his prescriptions of the morning.  I instructed him to increase 

his fluid intake.  He is to follow up with his primary care physician within 2-4

days.  He does need repeat laboratory studies drawn to ensure improvement in his

white count.  He is able to tolerate by mouth fluid intake in the emergency 

department. If the patient has any new or worsening symptoms he should return to

the emergency department.  Patient was in agreement with the treatment plan and 

discharged home in stable condition





- Differential Diagnosis


Gastroenteritis, food poisoning, IBD





- Lab Data


Result diagrams: 


                                 04/22/19 22:12





                                 04/22/19 22:12


                                   Lab Results











  04/22/19 04/22/19 04/22/19 Range/Units





  22:12 22:12 22:47 


 


WBC  13.9 H    (3.8-10.6)  k/uL


 


RBC  5.92 H    (4.30-5.90)  m/uL


 


Hgb  18.2 H    (13.0-17.5)  gm/dL


 


Hct  52.8    (39.0-53.0)  %


 


MCV  89.1    (80.0-100.0)  fL


 


MCH  30.7    (25.0-35.0)  pg


 


MCHC  34.4    (31.0-37.0)  g/dL


 


RDW  13.6    (11.5-15.5)  %


 


Plt Count  218    (150-450)  k/uL


 


Neutrophils %  86    %


 


Lymphocytes %  7    %


 


Monocytes %  3    %


 


Eosinophils %  2    %


 


Basophils %  0    %


 


Neutrophils #  12.0 H    (1.3-7.7)  k/uL


 


Lymphocytes #  1.0    (1.0-4.8)  k/uL


 


Monocytes #  0.5    (0-1.0)  k/uL


 


Eosinophils #  0.3    (0-0.7)  k/uL


 


Basophils #  0.0    (0-0.2)  k/uL


 


Sodium   142   (137-145)  mmol/L


 


Potassium   4.1   (3.5-5.1)  mmol/L


 


Chloride   105   ()  mmol/L


 


Carbon Dioxide   23   (22-30)  mmol/L


 


Anion Gap   14   mmol/L


 


BUN   11   (9-20)  mg/dL


 


Creatinine   0.67   (0.66-1.25)  mg/dL


 


Est GFR (CKD-EPI)AfAm   >90   (>60 ml/min/1.73 sqM)  


 


Est GFR (CKD-EPI)NonAf   >90   (>60 ml/min/1.73 sqM)  


 


Glucose   131 H   (74-99)  mg/dL


 


Calcium   9.8   (8.4-10.2)  mg/dL


 


Total Bilirubin   0.7   (0.2-1.3)  mg/dL


 


AST   42   (17-59)  U/L


 


ALT   114 H   (21-72)  U/L


 


Alkaline Phosphatase   105   ()  U/L


 


Total Protein   8.1   (6.3-8.2)  g/dL


 


Albumin   4.8   (3.5-5.0)  g/dL


 


Lipase   100   ()  U/L


 


Urine Color    Yellow  


 


Urine Appearance    Clear  (Clear)  


 


Urine pH    6.5  (5.0-8.0)  


 


Ur Specific Gravity    >1.050 H  (1.001-1.035)  


 


Urine Protein    Trace H  (Negative)  


 


Urine Glucose (UA)    Negative  (Negative)  


 


Urine Ketones    Negative  (Negative)  


 


Urine Blood    Small H  (Negative)  


 


Urine Nitrite    Negative  (Negative)  


 


Urine Bilirubin    Negative  (Negative)  


 


Urine Urobilinogen    <2.0  (<2.0)  mg/dL


 


Ur Leukocyte Esterase    Negative  (Negative)  


 


Urine RBC    3  (0-5)  /hpf


 


Urine WBC    1  (0-5)  /hpf


 


Ur Squamous Epith Cells    1  (0-4)  /hpf


 


Hyaline Casts    1  (0-2)  /lpf


 


Urine Mucus    Rare H  (None)  /hpf














Disposition


Clinical Impression: 


 Nausea and vomiting





Disposition: HOME SELF-CARE


Condition: Stable


Instructions (If sedation given, give patient instructions):  Acute Nausea and 

Vomiting (ED)


Additional Instructions: 


Please follow-up with your primary care doctor within 2-4 days.  Return to the 

emergency room for any new or worsening symptoms


Prescriptions: 


Dicyclomine [Bentyl] 10 mg PO TID PRN #15 capsule


 PRN Reason: diarrhea


Ondansetron Odt [Zofran Odt] 4 mg PO Q8HR PRN #10 tab


 PRN Reason: Nausea


Is patient prescribed a controlled substance at d/c from ED?: No


Referrals: 


Lorraine Escobar MD [Primary Care Provider] - 1-2 days


Time of Disposition: 23:54

## 2019-04-23 VITALS — HEART RATE: 86 BPM | DIASTOLIC BLOOD PRESSURE: 86 MMHG | TEMPERATURE: 99.1 F | SYSTOLIC BLOOD PRESSURE: 149 MMHG
